# Patient Record
Sex: FEMALE | Race: WHITE
[De-identification: names, ages, dates, MRNs, and addresses within clinical notes are randomized per-mention and may not be internally consistent; named-entity substitution may affect disease eponyms.]

---

## 2019-07-30 ENCOUNTER — HOSPITAL ENCOUNTER (OUTPATIENT)
Dept: HOSPITAL 92 - BICMAMMO | Age: 59
Discharge: HOME | End: 2019-07-30
Payer: COMMERCIAL

## 2019-07-30 DIAGNOSIS — Z12.31: Primary | ICD-10-CM

## 2019-07-30 PROCEDURE — 77063 BREAST TOMOSYNTHESIS BI: CPT

## 2019-07-30 PROCEDURE — 77067 SCR MAMMO BI INCL CAD: CPT

## 2019-07-30 NOTE — MMO
Bilateral MAMMO Bilat Screen DDI+JS.

 

CLINICAL HISTORY:

Patient is 58 years old and is seen for screening. The patient has no family

history of breast cancer.   The patient has a history of bilateral Implants at

age 41, left Excisional Biopsy at age 40 and left Mastectomy at age 40.

 

VIEWS:

The views performed were:  bilateral craniocaudal; bilateral mediolateral

oblique; and bilateral Implant displaced with tomosynthesis.

 

FILMS COMPARED:

The present examination has been compared to prior imaging studies performed at

[facility] on 04/17/2017 and 07/09/2018, and at [facility] on 05/10/2006 and 05/15/2007.

 

MAMMOGRAM FINDINGS:

There are scattered fibroglandular densities.

 

There is a stable intramammary lymph node seen in the right breast.

 

Bilateral implants are stable.

 

There are no suspicious masses, suspicious calcifications, or new areas of

architectural distortion.

 

IMPRESSION:

THERE IS NO MAMMOGRAPHIC EVIDENCE OF MALIGNANCY.

 

A ROUTINE FOLLOW-UP MAMMOGRAM IN 1 YEAR IS RECOMMENDED.

 

THE RESULTS OF THIS EXAM WERE SENT TO THE PATIENT.

 

ACR BI-RADS Category 2 - Benign finding

 

MAMMOGRAPHY NOTE:

 1. A negative mammogram report should not delay a biopsy if a dominant of

 clinically suspicious mass is present.

 2. Approximately 10% to 15% of breast cancers are not detected by

 mammography.

 3. Adenosis and dense breasts may obscure an underlying neoplasm.

 

 

Reported by: NAZ KATZ MD    Electonically Signed: [id number]

## 2020-11-18 ENCOUNTER — HOSPITAL ENCOUNTER (OUTPATIENT)
Dept: HOSPITAL 92 - BICMAMMO | Age: 60
Discharge: HOME | End: 2020-11-18
Payer: COMMERCIAL

## 2020-11-18 DIAGNOSIS — N63.31: Primary | ICD-10-CM

## 2020-11-18 PROCEDURE — 77066 DX MAMMO INCL CAD BI: CPT

## 2020-11-18 PROCEDURE — G0279 TOMOSYNTHESIS, MAMMO: HCPCS

## 2020-11-18 NOTE — MMO
Right Breast MAMMO Unilat Diag DDI RT+JS.

 

CLINICAL HISTORY:

Patient is 60 years old and is seen for diagnostic exam. The patient has no

family history of breast cancer.   The patient has a history of bilateral

Implants at age 41, left Excisional Biopsy at age 40 and left Mastectomy at age

40.

 

VIEWS:

The views performed were:  right craniocaudal with tomosynthesis; right

mediolateral oblique with tomosynthesis; right mediolateral with tomosynthesis;

right craniocaudal implant displaced; right mediolateral oblique implant

displaced; and right mediolateral implant displaced.

 

FILMS COMPARED:

The present examination has been compared to prior imaging studies performed at

[facility] on 04/17/2017, 07/09/2018, 07/30/2019 and 11/18/2020.

 

This study has been interpreted with the assistance of computer-aided detection.

 

MAMMOGRAM FINDINGS:

There are scattered fibroglandular densities.

 

There are stable benign appearing calcifications seen in the right breast.

 

There are no suspicious masses, suspicious calcifications, or new areas of

architectural distortion.

 

IMPRESSION:

THERE IS NO MAMMOGRAPHIC EVIDENCE OF MALIGNANCY.

 

A ROUTINE FOLLOW-UP MAMMOGRAM IN 1 YEAR IS RECOMMENDED.

 

THE RESULTS OF THIS EXAM WERE SENT TO THE PATIENT.

 

ACR BI-RADS Category 2 - Benign finding

 

MAMMOGRAPHY NOTE:

 1. A negative mammogram report should not delay a biopsy if a dominant of

 clinically suspicious mass is present.

 2. Approximately 10% to 15% of breast cancers are not detected by

 mammography.

 3. Adenosis and dense breasts may obscure an underlying neoplasm.

 

 

Reported by: MARVIN DOMINGUEZ MD

Electonically Signed: [id number]

## 2020-11-18 NOTE — ULT
US Breast Limited Rt: 11/18/2020 12:00 AM



CLINICAL INDICATION: Palpable axillary mass.



COMPARISON: 11/18/2020, 7/30/2019, 7/9/2018



TECHNIQUE: Multiplanar grayscale and color Doppler images were obtained of the breast.



FINDINGS: 

Normal appearing parenchyma is seen.   No suspicious mass is seen.   No suspicious shadowing is seen.
   No cyst is identified. A normal appearing lymph node is seen at the area of palpable abnormality

in the axilla.

 



IMPRESSION: BI-RADS Category 2-benign findings.



Reported By: Regan Padilla 

Electronically Signed:  11/18/2020 3:14 PM

## 2021-02-25 ENCOUNTER — HOSPITAL ENCOUNTER (OUTPATIENT)
Dept: HOSPITAL 92 - BICRAD | Age: 61
Discharge: HOME | End: 2021-02-25
Attending: FAMILY MEDICINE
Payer: COMMERCIAL

## 2021-02-25 DIAGNOSIS — M47.817: ICD-10-CM

## 2021-02-25 DIAGNOSIS — G60.9: Primary | ICD-10-CM

## 2021-02-25 DIAGNOSIS — M51.37: ICD-10-CM

## 2021-02-25 DIAGNOSIS — M47.816: ICD-10-CM

## 2021-02-25 PROCEDURE — 72100 X-RAY EXAM L-S SPINE 2/3 VWS: CPT

## 2021-02-25 NOTE — RAD
RADIOGRAPH LUMBAR SPINE 2 VIEWS:



DATE:

2/25/2021



HISTORY:

60-year-old female with low back pain



COMPARISON:

12/2/2015



FINDINGS:

2 standing views. 5 lumbar-type vertebrae. High-grade degenerative bilateral facet hypertrophy at L4-
5 and L5-S1.

Somewhat severe disc space narrowing with vacuum disc phenomenon at L5-S1.

No high-grade disc space narrowing at rest of levels.

Interval development of minimal grade 1 anterolisthesis of L4 on L5 by a couple of millimeters, due t
o the facet DJD.

No other significant interval change.

No high-grade scoliosis.

Vertebral body heights are maintained.



IMPRESSION:

Lumbar spondylosis consisting of high-grade facet osteoarthrosis at L4-5 and L5-S1, and high-grade de
generative disc disease at L5-S1.



Reported By: Rodger Guevara 

Electronically Signed:  2/25/2021 11:39 AM